# Patient Record
Sex: FEMALE | Race: WHITE | NOT HISPANIC OR LATINO
[De-identification: names, ages, dates, MRNs, and addresses within clinical notes are randomized per-mention and may not be internally consistent; named-entity substitution may affect disease eponyms.]

---

## 2017-01-04 ENCOUNTER — RX RENEWAL (OUTPATIENT)
Age: 77
End: 2017-01-04

## 2017-01-09 ENCOUNTER — RX RENEWAL (OUTPATIENT)
Age: 77
End: 2017-01-09

## 2017-01-10 ENCOUNTER — RX RENEWAL (OUTPATIENT)
Age: 77
End: 2017-01-10

## 2017-01-11 ENCOUNTER — RX RENEWAL (OUTPATIENT)
Age: 77
End: 2017-01-11

## 2017-01-17 ENCOUNTER — RX RENEWAL (OUTPATIENT)
Age: 77
End: 2017-01-17

## 2017-02-02 ENCOUNTER — RX RENEWAL (OUTPATIENT)
Age: 77
End: 2017-02-02

## 2017-02-07 ENCOUNTER — APPOINTMENT (OUTPATIENT)
Dept: NEUROLOGY | Facility: CLINIC | Age: 77
End: 2017-02-07

## 2017-05-22 ENCOUNTER — RX RENEWAL (OUTPATIENT)
Age: 77
End: 2017-05-22

## 2017-11-07 ENCOUNTER — APPOINTMENT (OUTPATIENT)
Dept: NEUROLOGY | Facility: CLINIC | Age: 77
End: 2017-11-07

## 2018-04-17 ENCOUNTER — APPOINTMENT (OUTPATIENT)
Dept: NEUROLOGY | Facility: CLINIC | Age: 78
End: 2018-04-17
Payer: MEDICARE

## 2018-04-17 VITALS
DIASTOLIC BLOOD PRESSURE: 77 MMHG | BODY MASS INDEX: 38.28 KG/M2 | WEIGHT: 195 LBS | SYSTOLIC BLOOD PRESSURE: 182 MMHG | HEART RATE: 65 BPM | HEIGHT: 60 IN

## 2018-04-17 DIAGNOSIS — R52 PAIN, UNSPECIFIED: ICD-10-CM

## 2018-04-17 DIAGNOSIS — E03.9 HYPOTHYROIDISM, UNSPECIFIED: ICD-10-CM

## 2018-04-17 PROCEDURE — 99214 OFFICE O/P EST MOD 30 MIN: CPT

## 2018-04-17 RX ORDER — GABAPENTIN 100 MG/1
100 CAPSULE ORAL DAILY
Qty: 30 | Refills: 2 | Status: ACTIVE | COMMUNITY
Start: 2018-04-17

## 2018-04-17 RX ORDER — LEVOTHYROXINE SODIUM 25 UG/1
25 TABLET ORAL DAILY
Refills: 0 | Status: ACTIVE | COMMUNITY
Start: 2018-04-17

## 2019-12-24 ENCOUNTER — APPOINTMENT (OUTPATIENT)
Dept: NEUROLOGY | Facility: CLINIC | Age: 79
End: 2019-12-24
Payer: MEDICARE

## 2019-12-24 VITALS
HEART RATE: 76 BPM | HEIGHT: 60 IN | SYSTOLIC BLOOD PRESSURE: 111 MMHG | WEIGHT: 200 LBS | DIASTOLIC BLOOD PRESSURE: 69 MMHG | BODY MASS INDEX: 39.27 KG/M2

## 2019-12-24 DIAGNOSIS — F02.80 ALZHEIMER'S DISEASE, UNSPECIFIED: ICD-10-CM

## 2019-12-24 DIAGNOSIS — G30.9 ALZHEIMER'S DISEASE, UNSPECIFIED: ICD-10-CM

## 2019-12-24 DIAGNOSIS — R53.81 OTHER MALAISE: ICD-10-CM

## 2019-12-24 DIAGNOSIS — I50.9 HEART FAILURE, UNSPECIFIED: ICD-10-CM

## 2019-12-24 DIAGNOSIS — F41.9 ANXIETY DISORDER, UNSPECIFIED: ICD-10-CM

## 2019-12-24 DIAGNOSIS — F32.9 ANXIETY DISORDER, UNSPECIFIED: ICD-10-CM

## 2019-12-24 PROCEDURE — 99214 OFFICE O/P EST MOD 30 MIN: CPT

## 2019-12-24 RX ORDER — FUROSEMIDE 40 MG/1
40 TABLET ORAL TWICE DAILY
Refills: 0 | Status: ACTIVE | COMMUNITY
Start: 2019-12-24

## 2019-12-24 NOTE — HISTORY OF PRESENT ILLNESS
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 \par STM very bad.\par HPI-Interval Hx 2017:\par Pt here with daughter and son.\par She has moved to Assisted Living ([facility]). She is fine there, more social, interacts a lot, and per family she is pretty stable.\par She has put on a bit of weight due to forgetting she has eaten and wants to eat all the times.\par No falls. No incontinence.\par Pt was started on Neurontin 100mg at night, for no clear reason.\par \par PMH:\par 77yo LH WW, here with 2 daughters, for management of dementia.\par Pt seen by Dr. Hanson, and Dilan Gandara. D/c due to insurance issues.\par Pt has a dx of dementia, noticed to be forgetful and disorganized by her family, around 2y ago.\par Prior to that, her   8y ago, and she felt very depressed, getting better a few years after, but then 2-3 y ago started having memory and organizational issues. \par She currently lives alone, but is constantly supervised. \par ADl ok, IADL needs help, cannot cook.\par Pt on Donepezil for 1.5y, stopped over Dec 2015 for 1 month, with worsening of her performance. Rx restarted soon later and now pt a bit better. Has paranoid ideation on the family willing to control her and take away her licence. No hallucination, no formed delusions. \par Overall, short term memory and executive function is slowing worsening. \par Sleep ok, wakes up at 6am.\par Activity: was very active with [employer], but let it go recently.\par Not driving anymore.

## 2019-12-24 NOTE — REVIEW OF SYSTEMS
[As Noted in HPI] : as noted in HPI [Negative] : Heme/Lymph [Recent Weight Gain (___ Lbs)] : recent [unfilled] ~Ulb weight gain

## 2019-12-24 NOTE — PHYSICAL EXAM
[General Appearance - Alert] : alert [General Appearance - In No Acute Distress] : in no acute distress [Impaired Insight] : insight and judgment were intact [Affect] : the affect was normal [Mood] : the mood was normal [Over the Past 2 Weeks, Have You Felt Little Interest or Pleasure Doing Things?] : 2.) Over the past 2 weeks, have you felt little interest or pleasure doing things? Yes [Person] : oriented to person [Concentration Intact] : normal concentrating ability [Naming Objects] : no difficulty naming common objects [Visual Intact] : visual attention was ~T not ~L decreased [Fluency] : fluency intact [Repeating Phrases] : no difficulty repeating a phrase [Comprehension] : comprehension intact [Reading] : reading intact [Past History] : adequate knowledge of personal past history [Vocabulary] : adequate range of vocabulary [Registration ___ / 3] : registration [unfilled] / 3 [Serial Sevens ___/5] : serial sevens [unfilled] / 5 [Naming 2 Objects ___ / 2] : naming two objects [unfilled] / 2 [Repeating a Sentence ___ / 1] : repeating a sentence [unfilled] / 1 [Writing a Sentence ___ / 1] : write sentence [unfilled] / 1 [3-stage Verbal Command ___ / 3] : three-stage verbal command [unfilled] / 3 [Copy a Design ___ / 1] : copy a design [unfilled] / 1 [Written Command ___ / 1] : written command [unfilled] / 1 [Recall ___ / 3] : recall [unfilled] / 3 [Cranial Nerves Optic (II)] : visual acuity intact bilaterally,  visual fields full to confrontation, pupils equal round and reactive to light [Cranial Nerves Oculomotor (III)] : extraocular motion intact [Cranial Nerves Trigeminal (V)] : facial sensation intact symmetrically [Cranial Nerves Vestibulocochlear (VIII)] : hearing was intact bilaterally [Cranial Nerves Facial (VII)] : face symmetrical [Cranial Nerves Hypoglossal (XII)] : there was no tongue deviation with protrusion [Cranial Nerves Glossopharyngeal (IX)] : tongue and palate midline [Cranial Nerves Accessory (XI - Cranial And Spinal)] : head turning and shoulder shrug symmetric [Motor Strength] : muscle strength was normal in all four extremities [Involuntary Movements] : no involuntary movements were seen [Motor Handedness Left-Handed] : the patient is left hand dominant [No Muscle Atrophy] : normal bulk in all four extremities [Sensation Tactile Decrease] : light touch was intact [Sensation Pain / Temperature Decrease] : pain and temperature was intact [Sensation Vibration Decrease] : vibration was intact [Proprioception] : proprioception was intact [Balance] : balance was intact [2+] : Ankle jerk left 2+ [PERRL With Normal Accommodation] : pupils were equal in size, round, reactive to light, with normal accommodation [Sclera] : the sclera and conjunctiva were normal [Extraocular Movements] : extraocular movements were intact [Optic Disc Abnormality] : the optic disc were normal in size and color [No APD] : no afferent pupillary defect [No JUANIS] : no internuclear ophthalmoplegia [Full Visual Field] : full visual field [Outer Ear] : the ears and nose were normal in appearance [Oropharynx] : the oropharynx was normal [Neck Cervical Mass (___cm)] : no neck mass was observed [Neck Appearance] : the appearance of the neck was normal [Thyroid Diffuse Enlargement] : the thyroid was not enlarged [Jugular Venous Distention Increased] : there was no jugular-venous distention [Auscultation Breath Sounds / Voice Sounds] : lungs were clear to auscultation bilaterally [Thyroid Nodule] : there were no palpable thyroid nodules [Heart Sounds] : normal S1 and S2 [Heart Rate And Rhythm] : heart rate was normal and rhythm regular [Heart Sounds Gallop] : no gallops [Murmurs] : no murmurs [Heart Sounds Pericardial Friction Rub] : no pericardial rub [Arterial Pulses Carotid] : carotid pulses were normal with no bruits [Full Pulse] : the pedal pulses are present [Edema] : there was no peripheral edema [Abdomen Soft] : soft [Bowel Sounds] : normal bowel sounds [Abdomen Mass (___ Cm)] : no abdominal mass palpated [Abdomen Tenderness] : non-tender [No CVA Tenderness] : no ~M costovertebral angle tenderness [No Spinal Tenderness] : no spinal tenderness [Abnormal Walk] : normal gait [Musculoskeletal - Swelling] : no joint swelling seen [Nail Clubbing] : no clubbing  or cyanosis of the fingernails [Motor Tone] : muscle strength and tone were normal [Skin Color & Pigmentation] : normal skin color and pigmentation [Skin Turgor] : normal skin turgor [] : no rash [Writing A Sentence] : no difficulty writing a sentence [Date / Time ___ / 5] : date / time [unfilled] / 5 [Total Score ___ / 30] : the patient achieved a score of [unfilled] /30 [Place ___ / 5] : place [unfilled] / 5 [Over the Past 2 Weeks, Have You Felt Down, Depressed, or Hopeless?] : 1.) Over the past 2 weeks, have you felt down, depressed, or hopeless? No [Place] : disoriented to place [Short Term Intact] : short term memory impaired [Time] : disoriented to time [Span Intact] : the attention span was decreased [Current Events] : inadequate knowledge of current events [Motor Strength Upper Extremities Bilaterally] : strength was normal in both upper extremities [Motor Strength Lower Extremities Bilaterally] : strength was normal in both lower extremities [Romberg's Sign] : Romberg's sign was negtive [Allodynia] : no ~T allodynia present [Dysesthesia] : no dysesthesia [Hyperesthesia] : no hyperesthesia [Limited Balance] : balance was intact [Past-pointing] : there was no past-pointing [Tremor] : no tremor present [Dysdiadochokinesia Bilaterally] : not present [Coordination - Dysmetria Impaired Finger-to-Nose Bilateral] : not present [Coordination - Dysmetria Impaired Heel-to-Shin Bilateral] : not present [Plantar Reflex Right Only] : normal on the right [Plantar Reflex Left Only] : normal on the left [FreeTextEntry4] : Alt pattern: intact.\par Clock: 2/3, hand on 10 and 11.\par Luria: forgets pattern\par Fluency: ok. [FreeTextEntry6] : mild essential tremor distal hands and face [FreeTextEntry8] : Cautioned gait, has gained more weight.

## 2019-12-24 NOTE — ASSESSMENT
[FreeTextEntry1] : Assessment:\par 78yo LH WW, LOAD Amyloid PET 3+/4.\par Progression over the years, to moderate dementia now.\par Lives in [place], but needs more assistance. \par Slower gait, no overt parkinsonism, mostly related to deconditioning and increased weight. \par + Incontinence now.\par \par \par Diagnostic Impression:\par LOAD\par Deconditioning. \par \par Plan:\par -continue current regimen\par -encourage activity and exercise\par -encourage weight loss.\par \par A thorough discussion was entertained with the patient/caregiver regarding the use of psychoactive medications, their possible benefits and AE profile, including the risk of cardiovascular complications.\par We discussed the benefits of being active, physically and mentally, and the need to to establish a routine in this respect.\par Driving abilities and firearms possession and use were discussed, in relation to progression of the cognitive decline, and the need to assess them periodically.\par Patient/caregiver understand and agree with the plan.\par

## 2019-12-24 NOTE — DATA REVIEWED
[de-identified] : MRI BRAIN 3/2015: Parenchymal  volume  loss  is  seen.\par There  is  no  evidence  of  acute  hemorrhage,  mass,  mass  effect  in  the  posterior\par fossa  or  supratentorial  region\par Evaluation  of  the  diffusion  weighted  sequence  demonstrates  no  abnormal  areas\par of  restricted  diffusion  to  suggest  acute  infarct.\par There  are  no  abnormal  intra-axial  or  extra-axial  collections  seen.\par Large  vessels  and  treatment  normal  flow  voids.\par Well-defined  cyst  is  identified  in  the  maxilla  bone.  This  is  seen  anteriorly\par and  just  to  the  left  of  the  midline.  This  is  likely  compatible  with  a  benign\par osseous  cyst.  This  finding  measures  approximately  1.0  x  0.7  cm.  The\par paranasal  sinuses  mastoid  and  middle  ear  regions  appear  clear. [de-identified] : reviewed records from other office.